# Patient Record
Sex: FEMALE | Race: WHITE
[De-identification: names, ages, dates, MRNs, and addresses within clinical notes are randomized per-mention and may not be internally consistent; named-entity substitution may affect disease eponyms.]

---

## 2018-10-29 ENCOUNTER — HOSPITAL ENCOUNTER (EMERGENCY)
Dept: HOSPITAL 1 - ED | Age: 23
Discharge: HOME | End: 2018-10-29
Payer: MEDICAID

## 2018-10-29 VITALS
HEIGHT: 66 IN | BODY MASS INDEX: 44.68 KG/M2 | WEIGHT: 278 LBS | WEIGHT: 278 LBS | HEIGHT: 66 IN | BODY MASS INDEX: 44.68 KG/M2

## 2018-10-29 VITALS — SYSTOLIC BLOOD PRESSURE: 143 MMHG | DIASTOLIC BLOOD PRESSURE: 93 MMHG

## 2018-10-29 DIAGNOSIS — J03.90: Primary | ICD-10-CM

## 2019-02-06 ENCOUNTER — HOSPITAL ENCOUNTER (EMERGENCY)
Dept: HOSPITAL 26 - MED | Age: 24
Discharge: HOME | End: 2019-02-06
Payer: MEDICAID

## 2019-02-06 VITALS — SYSTOLIC BLOOD PRESSURE: 132 MMHG | DIASTOLIC BLOOD PRESSURE: 76 MMHG

## 2019-02-06 VITALS — HEIGHT: 65 IN | WEIGHT: 200 LBS | BODY MASS INDEX: 33.32 KG/M2
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
## 2019-02-06 DIAGNOSIS — S16.1XXA: Primary | ICD-10-CM
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
## 2019-02-06 DIAGNOSIS — R07.89: ICD-10-CM

## 2019-02-06 NOTE — NUR
Patient discharged with v/s stable. Written and verbal after care instructions 
given and explained. 

Patient alert, oriented and verbalized understanding of instructions. 
Ambulatory with steady gait. All questions addressed prior to discharge. ID 
band removed. Patient advised to follow up with PMD. Rx of ACETAMINOPHEN given. 
Patient educated on indication of medication including possible reaction and 
side effects. Opportunity to ask questions provided and answered.

## 2019-02-06 NOTE — NUR
BIB SELF WITH C/O FACE PAIN SINCE LAST NIGHT. PT STATES IT FEELS TIGHT AND SORE 
ON THE RIGHT SIDE OF THE FACE. NO TRAUMA/INJURY. NO SWELLING OR DEFORMITY 
NOTED. NO OTHER SYMPTOMS AT THIS TIME.